# Patient Record
Sex: FEMALE | Race: WHITE | HISPANIC OR LATINO
[De-identification: names, ages, dates, MRNs, and addresses within clinical notes are randomized per-mention and may not be internally consistent; named-entity substitution may affect disease eponyms.]

---

## 2021-02-11 ENCOUNTER — APPOINTMENT (OUTPATIENT)
Dept: PLASTIC SURGERY | Facility: CLINIC | Age: 66
End: 2021-02-11
Payer: COMMERCIAL

## 2021-02-11 VITALS — HEIGHT: 64 IN | BODY MASS INDEX: 28.68 KG/M2 | WEIGHT: 168 LBS

## 2021-02-11 DIAGNOSIS — Z87.09 PERSONAL HISTORY OF OTHER DISEASES OF THE RESPIRATORY SYSTEM: ICD-10-CM

## 2021-02-11 DIAGNOSIS — Z78.9 OTHER SPECIFIED HEALTH STATUS: ICD-10-CM

## 2021-02-11 DIAGNOSIS — Z87.39 PERSONAL HISTORY OF OTHER DISEASES OF THE MUSCULOSKELETAL SYSTEM AND CONNECTIVE TISSUE: ICD-10-CM

## 2021-02-11 DIAGNOSIS — M50.10 CERVICAL DISC DISORDER WITH RADICULOPATHY, UNSPECIFIED CERVICAL REGION: ICD-10-CM

## 2021-02-11 PROBLEM — Z00.00 ENCOUNTER FOR PREVENTIVE HEALTH EXAMINATION: Status: ACTIVE | Noted: 2021-02-11

## 2021-02-11 PROCEDURE — 99072 ADDL SUPL MATRL&STAF TM PHE: CPT

## 2021-02-11 PROCEDURE — 99203 OFFICE O/P NEW LOW 30 MIN: CPT

## 2021-02-11 RX ORDER — ALBUTEROL 90 MCG
AEROSOL (GRAM) INHALATION
Refills: 0 | Status: ACTIVE | COMMUNITY

## 2021-02-11 RX ORDER — TRAMADOL HYDROCHLORIDE 25 MG/1
TABLET, COATED ORAL
Refills: 0 | Status: ACTIVE | COMMUNITY

## 2021-02-11 NOTE — HISTORY OF PRESENT ILLNESS
[FreeTextEntry1] : 64 yo F with PMHx of Asthma, OA and spinal stenosis who is RHD and presents today for evaluation of right hand pain for the past several years getting worse associated with finger paresthesia, hand weakness and limited ROM. EMG in September revealed CTS. Wears the splint at all times for symptom relief. \par \par \par Occupation - retired from [employer], works as [place]  at [employer]\par Nonsmoker

## 2021-02-11 NOTE — ASSESSMENT
[FreeTextEntry1] : 66 yo F with cervical radiculopathy at C5-6 now with right CTS. \par \par - recommend CTR\par - continue splint \par \par on exam has R CTS--thenar atrophy  ? ulnar motor weakness\par \par has been wearing splint for a few months--does help\par \par \par sugegsted scheduled CTR\par \par suggest nigth time splint until CTR in office\par \par Regarding the hand surgery, we discussed the risk of bleeding, infection, need for additional unplanned surgery, need for postoperative hand occupational therapy, possible lack of improvement and diminished hand function.  We discussed prolonged recovery.  All questions were answered and all risks were well understood by the patient.\par \par Due to COVID 19, pre-visit patient instructions were explained to the patient and their symptoms were checked upon arrival.  \par Masks were used by the health care providers and staff and the examination room was cleaned after the patient visit was completed.\par \par also suggest evaluation by NSGY for cervical radiculopathy--contact info for Dr Rae and George\par \par All ?s answered\par \par I explained plan numerous times to pt and she understands\par \par

## 2021-02-11 NOTE — PHYSICAL EXAM
[de-identified] : well-developed and groomed female, NAD [de-identified] : NC/AT [de-identified] : PERRL [de-identified] : supple [de-identified] : unlabored breathing  [de-identified] : MONIKAR [de-identified] : soft, nontender  [de-identified] : Right hand- positive Tinel and Phalen as well as Finkelstein tests, painful ROM, SILT

## 2021-02-25 ENCOUNTER — TRANSCRIPTION ENCOUNTER (OUTPATIENT)
Age: 66
End: 2021-02-25

## 2021-03-01 ENCOUNTER — APPOINTMENT (OUTPATIENT)
Dept: NEUROSURGERY | Facility: CLINIC | Age: 66
End: 2021-03-01
Payer: COMMERCIAL

## 2021-03-01 DIAGNOSIS — M47.816 SPONDYLOSIS W/OUT MYELOPATHY OR RADICULOPATHY, LUMBAR REGION: ICD-10-CM

## 2021-03-01 DIAGNOSIS — G37.9 DEMYELINATING DISEASE OF CENTRAL NERVOUS SYSTEM, UNSPECIFIED: ICD-10-CM

## 2021-03-01 DIAGNOSIS — M47.812 SPONDYLOSIS W/OUT MYELOPATHY OR RADICULOPATHY, CERVICAL REGION: ICD-10-CM

## 2021-03-01 PROCEDURE — 99072 ADDL SUPL MATRL&STAF TM PHE: CPT

## 2021-03-01 PROCEDURE — 99204 OFFICE O/P NEW MOD 45 MIN: CPT

## 2021-03-02 VITALS — HEIGHT: 64 IN | BODY MASS INDEX: 28.68 KG/M2 | WEIGHT: 168 LBS

## 2021-03-03 NOTE — HISTORY OF PRESENT ILLNESS
[de-identified] : Ms. Burroughs presents today for evaluation of persistent neck and lower back pain, along with referred pain into the right arm. She also has numbness, paraesthesias, and pain in the right hand. She is scheduled for a right CTR with Dr. Mcmahon however he recommended she consult with me today for evaluation of radiculopathy and spinal pain. She also mentioned to me that she was told she may have multiple sclerosis years ago. She is also under the care of Dr. Michelle Ricci. \par \par I have reviewed MRIs today [facility]: \par - MRI cervical spine: diffuse cervical spondylosis, worse at C5/6 with associated foraminal narrowing. \par - MRI lumbar spine: lumbar DDD, facet arthropathy, bulging disc with foraminal narrowing L3/4, L4/5, and L5/S1. \par \par EMG UE 9/17/2020: \par - right C5, C6 radiculopathy. Sensorimotor polyneuropathy, consistent with right carpal tunnel syndrome.

## 2021-03-03 NOTE — ASSESSMENT
[FreeTextEntry1] : We had a thorough discussion regarding her condition. I would like an MRI of the brain to r/o demyelinating disease. She will also have an MRI cervical and MRI lumbar spine done to assess for progression of her known spondylosis / DDD. I will see her back once the MRIs are completed.

## 2021-04-30 ENCOUNTER — APPOINTMENT (OUTPATIENT)
Dept: PLASTIC SURGERY | Facility: CLINIC | Age: 66
End: 2021-04-30
Payer: COMMERCIAL

## 2021-04-30 PROCEDURE — 99072 ADDL SUPL MATRL&STAF TM PHE: CPT

## 2021-04-30 PROCEDURE — 64721 CARPAL TUNNEL SURGERY: CPT | Mod: RT

## 2021-04-30 NOTE — PROCEDURE
Abel Limon is a 68 year old male presenting for a follow-up of neuropathy pain.   Rates pain 7/10  Taking topamax for pain.    Procedure Follow Up: No    Medications, allergies and tobacco use reviewed.  Denies known Latex allergy or symptoms of Latex sensitivity.    REVIEW OF SYSTEMS:  Consitutional: Denies recent infection, fever, chills, or malaise  Nervous: numbness and tingling  Psychiatric problem(s):negative     [FreeTextEntry6] : Patient is a 65 year old female with right carpal tunnel syndrome.  \par \par Local anesthetic was administered using 1% lidocaine with epinephrine.  Area was prepped and draped in usual fashion after an appropriate dwell period for the local to have its intended effect.  Skin incision was made with a scalpel.  Superficial palmar fascia was divided and the transverse carpal ligament was visualized.  Motor branch of median nerve was not seen.  The TCL was divided.  The median nerve was visible and showed signs of chronic compression.  Hemostasis was confirmed and the area was irrigated copiously.  4-0 Nylon sutures were used for closure.  Area cleansed with normal saline.  Sterile dressing applied along with a compression wrap and a soft splint (4x4 gauze, kerlix, abdominal pad, Coban).\par \par Patient tolerated procedure well and understands post-op instructions.\par \par \par Due to COVID 19, pre-visit patient instructions were explained to the patient and their symptoms were checked upon arrival.  \par Masks were used by the health care providers and staff and the examination room was cleaned after the patient visit was completed.\par

## 2021-05-06 ENCOUNTER — APPOINTMENT (OUTPATIENT)
Dept: PLASTIC SURGERY | Facility: CLINIC | Age: 66
End: 2021-05-06
Payer: COMMERCIAL

## 2021-05-06 PROCEDURE — 99024 POSTOP FOLLOW-UP VISIT: CPT

## 2021-05-06 NOTE — ASSESSMENT
[FreeTextEntry1] : 66 yo F with cervical radiculopathy at C5-6 with symptomatic right CTS. \par Now POD#6 s/p Rt CTR. Doing well.\par \par - dressing changed\par - hand rest and elevation\par - f/u next week for suture removal\par \par Due to COVID 19, pre-visit patient instructions were explained to the patient and their symptoms were checked upon arrival.  \par Masks were used by the health care providers and staff and the examination room was cleaned after the patient visit was completed.\par \par

## 2021-05-06 NOTE — PHYSICAL EXAM
[de-identified] : well-developed and groomed female, NAD [de-identified] : Right hand- volar hand incision healing well, c/d/i with minor swelling as expected, good overall ROM

## 2021-05-06 NOTE — HISTORY OF PRESENT ILLNESS
[FreeTextEntry1] : 64 yo F with PMHx of Asthma, OA and spinal stenosis who is RHD and presents today for evaluation of right hand pain for the past several years getting worse associated with finger paresthesia, hand weakness and limited ROM. EMG in September revealed CTS. Wears the splint at all times for symptom relief. \par \par \par Occupation - retired from [employer], works as [email or web address]  at [facility] [employer]\par Nonsmoker \par \par Interval hx (5/6/21). Patient presents today POD#6 s/p Rt CTR. Doing well and reporting improvement in numbness of digits. Denies any f/c or bleeding.

## 2021-05-13 ENCOUNTER — APPOINTMENT (OUTPATIENT)
Dept: PLASTIC SURGERY | Facility: CLINIC | Age: 66
End: 2021-05-13
Payer: COMMERCIAL

## 2021-05-13 DIAGNOSIS — G56.01 CARPAL TUNNEL SYNDROME, RIGHT UPPER LIMB: ICD-10-CM

## 2021-05-13 PROCEDURE — 99024 POSTOP FOLLOW-UP VISIT: CPT

## 2021-05-13 NOTE — ASSESSMENT
[FreeTextEntry1] : 64 yo F with cervical radiculopathy at C5-6 with symptomatic right CTS. \par Now POD#13 s/p Rt CTR. Doing well.\par \par - sutures removed, steri strips applied\par - hand rest and elevation\par - OT for ROM\par - f/u 6 weeks \par \par Due to COVID 19, pre-visit patient instructions were explained to the patient and their symptoms were checked upon arrival.  \par Masks were used by the health care providers and staff and the examination room was cleaned after the patient visit was completed.\par \par

## 2021-05-13 NOTE — PHYSICAL EXAM
[de-identified] : well-developed and groomed female, NAD [de-identified] : Right hand- volar hand incision healing well, c/d/i with minor swelling as expected, good overall ROM

## 2021-05-13 NOTE — HISTORY OF PRESENT ILLNESS
[FreeTextEntry1] : 64 yo F with PMHx of Asthma, OA and spinal stenosis who is RHD and presents today for evaluation of right hand pain for the past several years getting worse associated with finger paresthesia, hand weakness and limited ROM. EMG in September revealed CTS. Wears the splint at all times for symptom relief. \par \par \par Occupation - retired from [employer], works as [employer]  at [facility]\par Nonsmoker \par \par Interval hx (5/6/21). Patient presents today POD#6 s/p Rt CTR. Doing well and reporting improvement in numbness of digits. Denies any f/c or bleeding. \par \par Interval hx (5/13/21). Patient presents today POD#13 s/p Rt CTR. Offers no new complaints other than resolving incisional discomfort and swelling.

## 2021-06-24 ENCOUNTER — APPOINTMENT (OUTPATIENT)
Dept: PLASTIC SURGERY | Facility: CLINIC | Age: 66
End: 2021-06-24
Payer: COMMERCIAL

## 2021-06-24 PROCEDURE — 99024 POSTOP FOLLOW-UP VISIT: CPT

## 2021-06-24 NOTE — HISTORY OF PRESENT ILLNESS
[FreeTextEntry1] : 66 yo F with PMHx of Asthma, OA and spinal stenosis who is RHD and presents today for evaluation of right hand pain for the past several years getting worse associated with finger paresthesia, hand weakness and limited ROM. EMG in September revealed CTS. Wears the splint at all times for symptom relief. \par \par \par Occupation - retired from [employer], works as [employer]  at [employer]\par Nonsmoker \par \par Interval hx (5/6/21). Patient presents today POD#6 s/p Rt CTR. Doing well and reporting improvement in numbness of digits. Denies any f/c or bleeding. \par \par Interval hx (5/13/21). Patient presents today POD#13 s/p Rt CTR. Offers no new complaints other than resolving incisional discomfort and swelling. \par \par Interval hx (6/24/21). Patient presents today 6 weeks s/p Rt CTR. Repors hand numbness is resolving and feels strength is improving. Doing therapy at home, no formal OT. Due to f/u with NSG regarding cervical radiculopathy.

## 2021-06-24 NOTE — ASSESSMENT
[FreeTextEntry1] : 66 yo F with cervical radiculopathy at C5-6 with symptomatic right CTS. \par Now 6 weeks s/p Rt CTR. Doing well.\par \par - discussed benefits of OT for ROM, pt prefers to continue at home\par - f/u with NSG regarding C-spine\par - f/u 3-4 months\par \par Due to COVID 19, pre-visit patient instructions were explained to the patient and their symptoms were checked upon arrival.  \par Masks were used by the health care providers and staff and the examination room was cleaned after the patient visit was completed.\par \par

## 2021-06-24 NOTE — PHYSICAL EXAM
[de-identified] : well-developed and groomed female, NAD [de-identified] : Right hand- volar hand scar slightly indurated, FROM, SILT, nontender throughout

## 2021-09-28 ENCOUNTER — APPOINTMENT (OUTPATIENT)
Dept: PLASTIC SURGERY | Facility: CLINIC | Age: 66
End: 2021-09-28
Payer: MEDICARE

## 2021-09-28 PROCEDURE — 99212 OFFICE O/P EST SF 10 MIN: CPT

## 2021-09-28 NOTE — PHYSICAL EXAM
[de-identified] : well-developed and groomed female, NAD [de-identified] : Right hand- volar hand scar healed, FROM, SILT, nontender throughout

## 2021-09-28 NOTE — HISTORY OF PRESENT ILLNESS
[FreeTextEntry1] : 64 yo F with PMHx of Asthma, OA and spinal stenosis who is RHD and presents today for evaluation of right hand pain for the past several years getting worse associated with finger paresthesia, hand weakness and limited ROM. EMG in September revealed CTS. Wears the splint at all times for symptom relief. \par \par \par Occupation - retired from [email or web address], works as [employer]  at [employer]\par Nonsmoker \par \par Interval hx (5/6/21). Patient presents today POD#6 s/p Rt CTR. Doing well and reporting improvement in numbness of digits. Denies any f/c or bleeding. \par \par Interval hx (5/13/21). Patient presents today POD#13 s/p Rt CTR. Offers no new complaints other than resolving incisional discomfort and swelling. \par \par Interval hx (6/24/21). Patient presents today 6 weeks s/p Rt CTR. Reports hand numbness is resolving and feels strength is improving. Doing therapy at home, no formal OT. Due to f/u with NSG regarding cervical radiculopathy.\par \par Interval hx (9/28/21). Patient presents today 5 months s/p Rt CTR. Still has not seen NSG as they rescheduled her appt for October. Reports pain is much better since surgery but not yet completely resolved. Continued hand OT at home and happy with progress.

## 2021-09-28 NOTE — ASSESSMENT
[FreeTextEntry1] : 64 yo F with cervical radiculopathy at C5-6 with symptomatic right CTS. \par Now 5 months s/p Rt CTR. Doing well.\par \par -NSG evaluation next month as scheduled\par \par as above\par doing well\par improved since CTR\par nsgy evaluation pending sgtill\par \par f/u prn\par \par Due to COVID-19, pre-visit patient instructions were explained to the patient and their symptoms were checked upon arrival. Masks were used by the healthcare provider and staff and the examination room was cleaned after the patient visit concluded\par

## 2021-10-04 ENCOUNTER — APPOINTMENT (OUTPATIENT)
Dept: NEUROSURGERY | Facility: CLINIC | Age: 66
End: 2021-10-04

## 2022-08-24 ENCOUNTER — OUTPATIENT (OUTPATIENT)
Dept: OUTPATIENT SERVICES | Facility: HOSPITAL | Age: 67
LOS: 1 days | Discharge: HOME | End: 2022-08-24

## 2022-08-24 DIAGNOSIS — M54.2 CERVICALGIA: ICD-10-CM

## 2022-08-24 PROCEDURE — 72050 X-RAY EXAM NECK SPINE 4/5VWS: CPT | Mod: 26
